# Patient Record
Sex: FEMALE | Race: WHITE | NOT HISPANIC OR LATINO | URBAN - METROPOLITAN AREA
[De-identification: names, ages, dates, MRNs, and addresses within clinical notes are randomized per-mention and may not be internally consistent; named-entity substitution may affect disease eponyms.]

---

## 2017-01-11 ENCOUNTER — APPOINTMENT (RX ONLY)
Dept: URBAN - METROPOLITAN AREA CLINIC 57 | Facility: CLINIC | Age: 77
Setting detail: DERMATOLOGY
End: 2017-01-11

## 2017-01-11 DIAGNOSIS — R21 RASH AND OTHER NONSPECIFIC SKIN ERUPTION: ICD-10-CM | Status: UNCHANGED

## 2017-01-11 PROCEDURE — 11100: CPT

## 2017-01-11 PROCEDURE — ? CHRONOLOGY OF PRESENT ILLNESS

## 2017-01-11 PROCEDURE — ? COUNSELING

## 2017-01-11 PROCEDURE — ? ORDER TESTS

## 2017-01-11 PROCEDURE — ? SEPARATE AND IDENTIFIABLE DOCUMENTATION

## 2017-01-11 PROCEDURE — ? TREATMENT REGIMEN

## 2017-01-11 PROCEDURE — 99213 OFFICE O/P EST LOW 20 MIN: CPT | Mod: 25

## 2017-01-11 PROCEDURE — ? BIOPSY BY PUNCH METHOD

## 2017-01-11 PROCEDURE — ? PRESCRIPTION

## 2017-01-11 RX ORDER — TRIAMCINOLONE ACETONIDE 1 MG/G
CREAM TOPICAL
Qty: 1 | Refills: 0 | Status: ERX

## 2017-01-11 ASSESSMENT — LOCATION ZONE DERM
LOCATION ZONE: LEG
LOCATION ZONE: TRUNK
LOCATION ZONE: ARM

## 2017-01-11 ASSESSMENT — LOCATION DETAILED DESCRIPTION DERM
LOCATION DETAILED: LEFT MEDIAL SUPERIOR CHEST
LOCATION DETAILED: LEFT VENTRAL PROXIMAL FOREARM
LOCATION DETAILED: LEFT ANTERIOR DISTAL THIGH
LOCATION DETAILED: RIGHT BUTTOCK
LOCATION DETAILED: LEFT MEDIAL BREAST 7-8:00 REGION

## 2017-01-11 ASSESSMENT — LOCATION SIMPLE DESCRIPTION DERM
LOCATION SIMPLE: LEFT BREAST
LOCATION SIMPLE: LEFT THIGH
LOCATION SIMPLE: CHEST
LOCATION SIMPLE: RIGHT BUTTOCK
LOCATION SIMPLE: LEFT FOREARM

## 2017-01-11 NOTE — PROCEDURE: CHRONOLOGY OF PRESENT ILLNESS
Detail Level: Zone
Chronology Of Present Illness: rash has been present on the trunk, arm, and leg since mid November 2016 with intermittent red itchy bumps that wax and wane. She was initially treated with permethrin cream for possible scabies, followed by a prednisone taper (at the end of Nov 2016) and triamcinolone cream to use prn.

## 2017-01-11 NOTE — PROCEDURE: BIOPSY BY PUNCH METHOD
Detail Level: Detailed
X Size Of Lesion In Cm (Optional): 0
Lab: [id number]
Anesthesia Volume In Cc (Will Not Render If 0): 2
Lab Facility: [id number]
Punch Size In Mm: 4
Post-Care Instructions: I reviewed with the patient in detail post-care instructions. Patient is to keep the biopsy site dry overnight, and then apply bacitracin twice daily until healed. Patient may apply hydrogen peroxide soaks to remove any crusting.
Suture Removal: 10 days
Biopsy Type: H and E
Consent: Verbal consent was obtained and risks were reviewed including but not limited to scarring, infection, bleeding, scabbing, incomplete removal, nerve damage and allergy to anesthesia.
Dressing: bandage
Render Post-Care Instructions In Note?: no
Epidermal Sutures: 4-0 Prolene
Billing Type: Third-Party Bill
Anesthesia Type: 1% lidocaine with epinephrine and a 1:10 solution of 8.4% sodium bicarbonate
Home Suture Removal Text: Patient was provided a home suture removal kit and will remove their sutures at home.  If they have any questions or difficulties they will call the office.
Wound Care: Vaseline
Notification Instructions: Patient will be notified of biopsy results. However, patient instructed to call the office if not contacted within 2 weeks.
Hemostasis: Pressure

## 2017-01-11 NOTE — PROCEDURE: TREATMENT REGIMEN
Continue Regimen: TAC 0.1% topical cream qd -BID PRN
Detail Level: Zone
Plan: Further treatment pending biopsy results , lab results

## 2017-01-20 ENCOUNTER — APPOINTMENT (RX ONLY)
Dept: URBAN - METROPOLITAN AREA CLINIC 57 | Facility: CLINIC | Age: 77
Setting detail: DERMATOLOGY
End: 2017-01-20

## 2017-01-20 DIAGNOSIS — R21 RASH AND OTHER NONSPECIFIC SKIN ERUPTION: ICD-10-CM

## 2017-01-20 DIAGNOSIS — L82.1 OTHER SEBORRHEIC KERATOSIS: ICD-10-CM

## 2017-01-20 PROBLEM — Z92.3 PERSONAL HISTORY OF IRRADIATION: Status: ACTIVE | Noted: 2017-01-20

## 2017-01-20 PROBLEM — Z85.828 PERSONAL HISTORY OF OTHER MALIGNANT NEOPLASM OF SKIN: Status: ACTIVE | Noted: 2017-01-20

## 2017-01-20 PROBLEM — I10 ESSENTIAL (PRIMARY) HYPERTENSION: Status: ACTIVE | Noted: 2017-01-20

## 2017-01-20 PROBLEM — L29.8 OTHER PRURITUS: Status: ACTIVE | Noted: 2017-01-20

## 2017-01-20 PROCEDURE — 99213 OFFICE O/P EST LOW 20 MIN: CPT

## 2017-01-20 PROCEDURE — ? TREATMENT REGIMEN

## 2017-01-20 PROCEDURE — ? COUNSELING

## 2017-01-20 PROCEDURE — ? CHRONOLOGY OF PRESENT ILLNESS

## 2017-01-20 ASSESSMENT — LOCATION SIMPLE DESCRIPTION DERM
LOCATION SIMPLE: LEFT THIGH
LOCATION SIMPLE: LEFT BREAST
LOCATION SIMPLE: CHEST
LOCATION SIMPLE: LEFT FOREARM
LOCATION SIMPLE: RIGHT BUTTOCK

## 2017-01-20 ASSESSMENT — LOCATION DETAILED DESCRIPTION DERM
LOCATION DETAILED: LEFT MEDIAL BREAST 7-8:00 REGION
LOCATION DETAILED: LEFT VENTRAL PROXIMAL FOREARM
LOCATION DETAILED: LEFT MEDIAL SUPERIOR CHEST
LOCATION DETAILED: LEFT ANTERIOR DISTAL THIGH
LOCATION DETAILED: RIGHT BUTTOCK

## 2017-01-20 ASSESSMENT — LOCATION ZONE DERM
LOCATION ZONE: TRUNK
LOCATION ZONE: LEG
LOCATION ZONE: ARM

## 2017-01-20 NOTE — PROCEDURE: TREATMENT REGIMEN
Detail Level: Zone
Continue Regimen: TAC 0.1% topical cream qd -BID PRN
Plan: rec patient to schedule appointment with PCP to discuss possibly changing BP medication (amlodipine). Advised patient is not to discontinue Amlodipine until evaluated by PCP - letter written to PCP requesting HTN med change and giving copy of labs and path report (pt doesn't know name of who comes to her assisted care facility, but we have also recommended Dr. Yun). \\nDiscuss patient to return to clinic if rash- recurs/ worsens. Briefly discussed NBUVB light tx (pt feels this would be very difficult to come to the office twice/week). Rec increase light exposure to rash areas without burning.

## 2017-01-20 NOTE — PROCEDURE: CHRONOLOGY OF PRESENT ILLNESS
Chronology Of Present Illness: rash has been present on the trunk, arm, and leg since mid November 2016 with intermittent red itchy bumps that wax and wane. She was initially treated with permethrin cream for possible scabies, followed by a prednisone taper (at the end of Nov 2016) and triamcinolone cream to use prn.\\nCBC w/ diff, CMP and TSH 01/2017 were wnl\\nBiopsy from left thigh 01/2017 was c/w seborrheic keratosis without a significant inflammatory infiltrate\\n01/20/2017 recommend changing bp med from amolidipine to something else
Detail Level: Zone